# Patient Record
Sex: FEMALE | Race: BLACK OR AFRICAN AMERICAN
[De-identification: names, ages, dates, MRNs, and addresses within clinical notes are randomized per-mention and may not be internally consistent; named-entity substitution may affect disease eponyms.]

---

## 2022-12-08 PROBLEM — Z00.00 ENCOUNTER FOR PREVENTIVE HEALTH EXAMINATION: Status: ACTIVE | Noted: 2022-12-08

## 2022-12-19 ENCOUNTER — APPOINTMENT (OUTPATIENT)
Dept: VASCULAR SURGERY | Facility: CLINIC | Age: 57
End: 2022-12-19

## 2022-12-19 VITALS
DIASTOLIC BLOOD PRESSURE: 75 MMHG | SYSTOLIC BLOOD PRESSURE: 146 MMHG | HEART RATE: 73 BPM | WEIGHT: 198 LBS | BODY MASS INDEX: 33.8 KG/M2 | HEIGHT: 64 IN

## 2022-12-19 PROCEDURE — 99204 OFFICE O/P NEW MOD 45 MIN: CPT

## 2022-12-19 PROCEDURE — 93970 EXTREMITY STUDY: CPT

## 2022-12-19 NOTE — CONSULT LETTER
[Dear  ___] : Dear  [unfilled], [Consult Letter:] : I had the pleasure of evaluating your patient, [unfilled]. [Please see my note below.] : Please see my note below. [Consult Closing:] : Thank you very much for allowing me to participate in the care of this patient.  If you have any questions, please do not hesitate to contact me. [Sincerely,] : Sincerely, [FreeTextEntry3] : Enrique White M.D., F.MELISSAS., R.[email or web address].\par  of Vascular Surgery\par Assistant Professor of Radiology\par Director of Endovascular Program/ [facility]\par [facility]

## 2022-12-19 NOTE — HISTORY OF PRESENT ILLNESS
[FreeTextEntry1] : Patient is 57-year-old female with past medical history significant for diabetes presenting to us for evaluation of left lower extremity arterial circulation.  Patient was undergoing evaluation and work-up by the podiatry team because of the left fifth toe discomfort requiring surgical intervention.  During the x-ray patient was noted to have calcification of the circulation within the foot.\par \par Patient with no history of coronary artery disease or smoking.\par \par Patient denies any history of rest pain or claudication or history of tissue loss of lower extremities.

## 2022-12-19 NOTE — ASSESSMENT
[FreeTextEntry1] : Patient with calcification of pedal and tarsal branches in the foot.\par \par No evidence of significant arterial insufficiency of lower extremities based on clinical examination.  No vascular intervention necessary.\par \par No evidence of DVT or significant venous insufficiency\par \par Patient is cleared from a vascular surgery standpoint to undergo the podiatric procedure.\par \par

## 2022-12-19 NOTE — PHYSICAL EXAM
[2+] : left 2+ [Ankle Swelling (On Exam)] : not present [Varicose Veins Of Lower Extremities] : not present [] : not present [Abdomen Masses] : No abdominal masses [Skin Ulcer] : no ulcer [Alert] : alert [Oriented to Person] : oriented to person [Oriented to Place] : oriented to place